# Patient Record
Sex: MALE | Race: ASIAN
[De-identification: names, ages, dates, MRNs, and addresses within clinical notes are randomized per-mention and may not be internally consistent; named-entity substitution may affect disease eponyms.]

---

## 2019-12-13 ENCOUNTER — HOSPITAL ENCOUNTER (INPATIENT)
Dept: HOSPITAL 54 - TELE-TD | Age: 79
LOS: 5 days | Discharge: NURSING, REHAB OR LONG-TERM CARE | DRG: 308 | End: 2019-12-18
Attending: INTERNAL MEDICINE | Admitting: INTERNAL MEDICINE
Payer: MEDICARE

## 2019-12-13 VITALS — HEIGHT: 69 IN | BODY MASS INDEX: 28.5 KG/M2 | WEIGHT: 192.44 LBS

## 2019-12-13 DIAGNOSIS — Z87.891: ICD-10-CM

## 2019-12-13 DIAGNOSIS — E78.5: ICD-10-CM

## 2019-12-13 DIAGNOSIS — E87.6: ICD-10-CM

## 2019-12-13 DIAGNOSIS — J45.909: ICD-10-CM

## 2019-12-13 DIAGNOSIS — F32.9: ICD-10-CM

## 2019-12-13 DIAGNOSIS — E11.9: ICD-10-CM

## 2019-12-13 DIAGNOSIS — C61: ICD-10-CM

## 2019-12-13 DIAGNOSIS — Z85.46: ICD-10-CM

## 2019-12-13 DIAGNOSIS — N17.0: ICD-10-CM

## 2019-12-13 DIAGNOSIS — D64.9: ICD-10-CM

## 2019-12-13 DIAGNOSIS — J47.9: ICD-10-CM

## 2019-12-13 DIAGNOSIS — G93.41: ICD-10-CM

## 2019-12-13 DIAGNOSIS — E46: ICD-10-CM

## 2019-12-13 DIAGNOSIS — I48.91: Primary | ICD-10-CM

## 2019-12-13 DIAGNOSIS — C79.51: ICD-10-CM

## 2019-12-13 DIAGNOSIS — I95.9: ICD-10-CM

## 2019-12-13 DIAGNOSIS — I10: ICD-10-CM

## 2019-12-13 DIAGNOSIS — F03.90: ICD-10-CM

## 2019-12-13 DIAGNOSIS — E87.2: ICD-10-CM

## 2019-12-13 DIAGNOSIS — F41.9: ICD-10-CM

## 2019-12-13 DIAGNOSIS — E86.0: ICD-10-CM

## 2019-12-13 DIAGNOSIS — D68.59: ICD-10-CM

## 2019-12-13 DIAGNOSIS — J32.9: ICD-10-CM

## 2019-12-13 PROCEDURE — A4349 DISPOSABLE MALE EXTERNAL CAT: HCPCS

## 2019-12-13 PROCEDURE — A6253 ABSORPT DRG > 48 SQ IN W/O B: HCPCS

## 2019-12-13 PROCEDURE — G0378 HOSPITAL OBSERVATION PER HR: HCPCS

## 2019-12-14 VITALS — SYSTOLIC BLOOD PRESSURE: 110 MMHG | DIASTOLIC BLOOD PRESSURE: 40 MMHG

## 2019-12-14 VITALS — SYSTOLIC BLOOD PRESSURE: 125 MMHG | DIASTOLIC BLOOD PRESSURE: 54 MMHG

## 2019-12-14 VITALS — SYSTOLIC BLOOD PRESSURE: 125 MMHG | DIASTOLIC BLOOD PRESSURE: 55 MMHG

## 2019-12-14 VITALS — DIASTOLIC BLOOD PRESSURE: 90 MMHG | SYSTOLIC BLOOD PRESSURE: 134 MMHG

## 2019-12-14 VITALS — DIASTOLIC BLOOD PRESSURE: 42 MMHG | SYSTOLIC BLOOD PRESSURE: 95 MMHG

## 2019-12-14 VITALS — DIASTOLIC BLOOD PRESSURE: 60 MMHG | SYSTOLIC BLOOD PRESSURE: 126 MMHG

## 2019-12-14 LAB
ALBUMIN SERPL BCP-MCNC: 2.2 G/DL (ref 3.4–5)
ALP SERPL-CCNC: 312 U/L (ref 46–116)
ALT SERPL W P-5'-P-CCNC: 31 U/L (ref 12–78)
AST SERPL W P-5'-P-CCNC: 23 U/L (ref 15–37)
BASOPHILS # BLD AUTO: 0 /CMM (ref 0–0.2)
BASOPHILS NFR BLD AUTO: 1.2 % (ref 0–2)
BILIRUB SERPL-MCNC: 0.7 MG/DL (ref 0.2–1)
BUN SERPL-MCNC: 11 MG/DL (ref 7–18)
CALCIUM SERPL-MCNC: 7.1 MG/DL (ref 8.5–10.1)
CHLORIDE SERPL-SCNC: 108 MMOL/L (ref 98–107)
CHOLEST SERPL-MCNC: 104 MG/DL (ref ?–200)
CO2 SERPL-SCNC: 24 MMOL/L (ref 21–32)
CREAT SERPL-MCNC: 0.9 MG/DL (ref 0.6–1.3)
EOSINOPHIL NFR BLD AUTO: 3.3 % (ref 0–6)
FERRITIN SERPL-MCNC: 256 NG/ML (ref 8–388)
GLUCOSE SERPL-MCNC: 133 MG/DL (ref 74–106)
HCT VFR BLD AUTO: 31 % (ref 39–51)
HDLC SERPL-MCNC: 24 MG/DL (ref 40–60)
HGB BLD-MCNC: 10.3 G/DL (ref 13.5–17.5)
IRON SERPL-MCNC: 26 UG/DL (ref 50–175)
LDLC SERPL DIRECT ASSAY-MCNC: 62 MG/DL (ref 0–99)
LYMPHOCYTES NFR BLD AUTO: 1.4 /CMM (ref 0.8–4.8)
LYMPHOCYTES NFR BLD AUTO: 34.5 % (ref 20–44)
MCHC RBC AUTO-ENTMCNC: 33 G/DL (ref 31–36)
MCV RBC AUTO: 88 FL (ref 80–96)
MONOCYTES NFR BLD AUTO: 0.4 /CMM (ref 0.1–1.3)
MONOCYTES NFR BLD AUTO: 9.8 % (ref 2–12)
NEUTROPHILS # BLD AUTO: 2 /CMM (ref 1.8–8.9)
NEUTROPHILS NFR BLD AUTO: 51.2 % (ref 43–81)
PLATELET # BLD AUTO: 172 /CMM (ref 150–450)
POTASSIUM SERPL-SCNC: 2.8 MMOL/L (ref 3.5–5.1)
PROT SERPL-MCNC: 5.2 G/DL (ref 6.4–8.2)
RBC # BLD AUTO: 3.51 MIL/UL (ref 4.5–6)
SODIUM SERPL-SCNC: 142 MMOL/L (ref 136–145)
TIBC SERPL-MCNC: 179 UG/DL (ref 250–450)
TRIGL SERPL-MCNC: 122 MG/DL (ref 30–150)
TSH SERPL DL<=0.005 MIU/L-ACNC: 2.57 UIU/ML (ref 0.36–3.74)
WBC NRBC COR # BLD AUTO: 4 K/UL (ref 4.3–11)

## 2019-12-14 RX ADMIN — SODIUM CHLORIDE SCH MLS/HR: 9 INJECTION, SOLUTION INTRAVENOUS at 01:51

## 2019-12-14 RX ADMIN — POTASSIUM CHLORIDE SCH MLS/HR: 200 INJECTION, SOLUTION INTRAVENOUS at 06:55

## 2019-12-14 RX ADMIN — RAMIPRIL SCH MG: 5 CAPSULE ORAL at 20:49

## 2019-12-14 RX ADMIN — QUETIAPINE SCH MG: 25 TABLET, FILM COATED ORAL at 22:00

## 2019-12-14 RX ADMIN — POTASSIUM CHLORIDE SCH MLS/HR: 200 INJECTION, SOLUTION INTRAVENOUS at 03:33

## 2019-12-14 RX ADMIN — FAMOTIDINE SCH MG: 20 TABLET, FILM COATED ORAL at 18:15

## 2019-12-14 RX ADMIN — PANTOPRAZOLE SODIUM SCH MG: 40 TABLET, DELAYED RELEASE ORAL at 08:15

## 2019-12-14 RX ADMIN — BUDESONIDE SCH MG: 0.25 SUSPENSION RESPIRATORY (INHALATION) at 10:43

## 2019-12-14 RX ADMIN — PANCRELIPASE SCH EACH: 4200; 24600; 14200 CAPSULE, DELAYED RELEASE ORAL at 18:15

## 2019-12-14 RX ADMIN — SODIUM CHLORIDE SCH MLS/HR: 9 INJECTION, SOLUTION INTRAVENOUS at 20:52

## 2019-12-14 RX ADMIN — PANCRELIPASE SCH EACH: 4200; 24600; 14200 CAPSULE, DELAYED RELEASE ORAL at 13:19

## 2019-12-14 RX ADMIN — MAGNESIUM HYDROXIDE PRN ML: 400 SUSPENSION ORAL at 09:48

## 2019-12-14 RX ADMIN — RAMIPRIL SCH MG: 5 CAPSULE ORAL at 09:46

## 2019-12-14 RX ADMIN — FINASTERIDE SCH MG: 5 TABLET, FILM COATED ORAL at 08:16

## 2019-12-14 RX ADMIN — SODIUM CHLORIDE SCH MLS/HR: 9 INJECTION, SOLUTION INTRAVENOUS at 11:25

## 2019-12-14 RX ADMIN — PANCRELIPASE SCH EACH: 4200; 24600; 14200 CAPSULE, DELAYED RELEASE ORAL at 08:47

## 2019-12-14 RX ADMIN — POTASSIUM CHLORIDE SCH MLS/HR: 200 INJECTION, SOLUTION INTRAVENOUS at 04:33

## 2019-12-14 RX ADMIN — POTASSIUM CHLORIDE SCH MLS/HR: 200 INJECTION, SOLUTION INTRAVENOUS at 05:49

## 2019-12-14 RX ADMIN — BUDESONIDE SCH MG: 0.25 SUSPENSION RESPIRATORY (INHALATION) at 19:37

## 2019-12-14 RX ADMIN — BICALUTAMIDE SCH MG: 50 TABLET ORAL at 20:47

## 2019-12-14 RX ADMIN — ENOXAPARIN SODIUM SCH MG: 40 INJECTION SUBCUTANEOUS at 09:00

## 2019-12-14 NOTE — NUR
PT FRIEND MARTHA CAME BEDSIDE AND EXPRESSED CONCERN OVER PT LIVING ARRANGEMENT REQUESTED TO 
MEET WITH  MICHAEL SINCE PT LIVES AT HOME ALONE.

## 2019-12-14 NOTE — NUR
RN OPENING NOTES

PT IS ASLEEP IN BED WITH HOB ELEVATED TO 45 DEGREES. PT HAS SAILAJA IV 20 GAUGE RUNNING NS @ 100 
MLS/HR. PT IS SR IN 67 HR ON TELE MONITOR. PT SHOWS NO SIGNS OF SOB OR PAIN AT PRESENT 
MOMENT. BED IS LOCKED AND IN LOWEST POSITION WITH CALL LIGHT IN REACH. WILL CONTINUE TO 
MONITOR.

## 2019-12-14 NOTE — NUR
TD RN OPENING NOTE



REPORT RECEIVED BY JAIME FROM [place]. PATIENT ARRIVED IN [facility]. PATIENT IS A/OX4 
ENGLISH SPEAKER. NO SIGN OF DISTRESS OR DISCOMFORT. 02 SAT ON ROOM AIR IS 96%. HAS THREE IV 
ACCESS, RT FOREARM #20, LT FOREARM #20, AND LT UPPERARM #20 ALL PATENT AND FLUSHED. PATIENT 
DOES NOT COMPLAIN OF ANY PAIN AT THE MOMENT. SINUS RHYTHM ON THE MONITOR. LUNGS SOUNDS 
CLEAR. SKIN IS INTACT JUST MINOR SCRATCHER AND BRUISES. ALL SAFETY PRECAUTIONS HAVE BEEN 
APPLIED, WILL CONTINUE TO MONITOR PATIENT.

## 2019-12-14 NOTE — NUR
RN CLOSING NOTES 

PT DENIES ANY PAIN OR SOB AT PRESENT MOMENT PT IS SR ON TELE MONITOR AND CONTINUES TO BE ON 
O2 2 L NC. PT HAS NS RUNNING  MLS/HR. PT IS A&OX4 AND IS ABLE TO MAKE NEEDS KNOWN. BED 
IS LOCKED AND IN LOWEST POSITION WITH CALL LIGHT IN REACH. WILL ENDORSE JACQUES TO NIGHT SHIFT 
RN.

## 2019-12-14 NOTE — NUR
SPOKE WITH PT ABOUT HOME MEDIATIONS PER PT HE HAS NO ONE AT HOME THAT CAN BRING THEM TO HIM. 
INFORMED PHARMACY. WILL TRY TO OBTAIN PT'S PERSONAL PHARMACY.

## 2019-12-14 NOTE — NUR
TD RN CLOSING NOTE



PATIENT IN BED WITH NO SIGNS OF DISTRESS. PATIENT REMAINS ON 2L OF O2 SATURATING AT 97%. 
PATIENT ON MONITOR IS SINUS RHYTHM WITH HR IN 60'S/ NO COMPLAINT OF PAIN. ALL SAFETY 
PRECAUTIONS APPPLIED. ENDORSED PATIENT TO MORNING SHIFT NURSE.

## 2019-12-14 NOTE — NUR
RN NOTE:



RECEIVED A CALL FROM LAB, SPOKE WITH ZACARIAS DODSON+ [id number]. PAGED DR. BARNEY, AWAITING FOR 
ORDERS. WILL NOTIFY PRIMARY NURSE. 

-------------------------------------------------------------------------------

Addendum: 12/14/19 at 0247 by LAINE MICHEL RN

-------------------------------------------------------------------------------

RN NOTE:



0232: RECEIVED NEW ORDERS FROM DR. BARNEY.

## 2019-12-15 VITALS — SYSTOLIC BLOOD PRESSURE: 129 MMHG | DIASTOLIC BLOOD PRESSURE: 42 MMHG

## 2019-12-15 VITALS — SYSTOLIC BLOOD PRESSURE: 145 MMHG | DIASTOLIC BLOOD PRESSURE: 56 MMHG

## 2019-12-15 VITALS — DIASTOLIC BLOOD PRESSURE: 60 MMHG | SYSTOLIC BLOOD PRESSURE: 146 MMHG

## 2019-12-15 VITALS — DIASTOLIC BLOOD PRESSURE: 52 MMHG | SYSTOLIC BLOOD PRESSURE: 137 MMHG

## 2019-12-15 VITALS — SYSTOLIC BLOOD PRESSURE: 147 MMHG | DIASTOLIC BLOOD PRESSURE: 65 MMHG

## 2019-12-15 VITALS — DIASTOLIC BLOOD PRESSURE: 78 MMHG | SYSTOLIC BLOOD PRESSURE: 143 MMHG

## 2019-12-15 LAB
ALBUMIN SERPL BCP-MCNC: 2.2 G/DL (ref 3.4–5)
ALP SERPL-CCNC: 280 U/L (ref 46–116)
ALT SERPL W P-5'-P-CCNC: 30 U/L (ref 12–78)
AST SERPL W P-5'-P-CCNC: 24 U/L (ref 15–37)
BASOPHILS # BLD AUTO: 0 /CMM (ref 0–0.2)
BASOPHILS NFR BLD AUTO: 0.4 % (ref 0–2)
BILIRUB SERPL-MCNC: 0.5 MG/DL (ref 0.2–1)
BUN SERPL-MCNC: 5 MG/DL (ref 7–18)
CALCIUM SERPL-MCNC: 7 MG/DL (ref 8.5–10.1)
CHLORIDE SERPL-SCNC: 113 MMOL/L (ref 98–107)
CO2 SERPL-SCNC: 26 MMOL/L (ref 21–32)
CREAT SERPL-MCNC: 0.6 MG/DL (ref 0.6–1.3)
EOSINOPHIL NFR BLD AUTO: 2.9 % (ref 0–6)
GLUCOSE SERPL-MCNC: 119 MG/DL (ref 74–106)
HCT VFR BLD AUTO: 29 % (ref 39–51)
HGB BLD-MCNC: 9.4 G/DL (ref 13.5–17.5)
LYMPHOCYTES NFR BLD AUTO: 1.3 /CMM (ref 0.8–4.8)
LYMPHOCYTES NFR BLD AUTO: 28.1 % (ref 20–44)
MAGNESIUM SERPL-MCNC: 1.9 MG/DL (ref 1.8–2.4)
MCHC RBC AUTO-ENTMCNC: 33 G/DL (ref 31–36)
MCV RBC AUTO: 89 FL (ref 80–96)
MONOCYTES NFR BLD AUTO: 0.4 /CMM (ref 0.1–1.3)
MONOCYTES NFR BLD AUTO: 8.8 % (ref 2–12)
NEUTROPHILS # BLD AUTO: 2.7 /CMM (ref 1.8–8.9)
NEUTROPHILS NFR BLD AUTO: 59.8 % (ref 43–81)
PHOSPHATE SERPL-MCNC: 1.5 MG/DL (ref 2.5–4.9)
PLATELET # BLD AUTO: 151 /CMM (ref 150–450)
POTASSIUM SERPL-SCNC: 3.4 MMOL/L (ref 3.5–5.1)
PROT SERPL-MCNC: 5.3 G/DL (ref 6.4–8.2)
RBC # BLD AUTO: 3.22 MIL/UL (ref 4.5–6)
SODIUM SERPL-SCNC: 147 MMOL/L (ref 136–145)
WBC NRBC COR # BLD AUTO: 4.6 K/UL (ref 4.3–11)

## 2019-12-15 RX ADMIN — LEVALBUTEROL HYDROCHLORIDE SCH MG: 1.25 SOLUTION, CONCENTRATE RESPIRATORY (INHALATION) at 14:33

## 2019-12-15 RX ADMIN — RAMIPRIL SCH MG: 5 CAPSULE ORAL at 21:26

## 2019-12-15 RX ADMIN — BICALUTAMIDE SCH MG: 50 TABLET ORAL at 21:23

## 2019-12-15 RX ADMIN — PANTOPRAZOLE SODIUM SCH MG: 40 TABLET, DELAYED RELEASE ORAL at 08:53

## 2019-12-15 RX ADMIN — QUETIAPINE SCH MG: 25 TABLET, FILM COATED ORAL at 21:27

## 2019-12-15 RX ADMIN — PANCRELIPASE SCH EACH: 4200; 24600; 14200 CAPSULE, DELAYED RELEASE ORAL at 13:12

## 2019-12-15 RX ADMIN — BUDESONIDE SCH MG: 0.25 SUSPENSION RESPIRATORY (INHALATION) at 20:05

## 2019-12-15 RX ADMIN — FAMOTIDINE SCH MG: 20 TABLET, FILM COATED ORAL at 18:10

## 2019-12-15 RX ADMIN — SODIUM CHLORIDE SCH MLS/HR: 9 INJECTION, SOLUTION INTRAVENOUS at 06:56

## 2019-12-15 RX ADMIN — ENOXAPARIN SODIUM SCH MG: 40 INJECTION SUBCUTANEOUS at 08:57

## 2019-12-15 RX ADMIN — PANCRELIPASE SCH EACH: 4200; 24600; 14200 CAPSULE, DELAYED RELEASE ORAL at 18:10

## 2019-12-15 RX ADMIN — RAMIPRIL SCH MG: 5 CAPSULE ORAL at 08:55

## 2019-12-15 RX ADMIN — LEVALBUTEROL HYDROCHLORIDE SCH MG: 1.25 SOLUTION, CONCENTRATE RESPIRATORY (INHALATION) at 23:31

## 2019-12-15 RX ADMIN — BUDESONIDE SCH MG: 0.25 SUSPENSION RESPIRATORY (INHALATION) at 08:11

## 2019-12-15 RX ADMIN — LEVALBUTEROL HYDROCHLORIDE SCH MG: 1.25 SOLUTION, CONCENTRATE RESPIRATORY (INHALATION) at 13:02

## 2019-12-15 RX ADMIN — PANCRELIPASE SCH EACH: 4200; 24600; 14200 CAPSULE, DELAYED RELEASE ORAL at 08:55

## 2019-12-15 RX ADMIN — FINASTERIDE SCH MG: 5 TABLET, FILM COATED ORAL at 08:55

## 2019-12-15 NOTE — NUR
TD/RN     ROUNDS - DR. BENNETT



PT SEEN & EXAMINED BY DR. BENNETT. NO NEW ORDERS RECEIVED AT THIS TIME.  MONITORING 
MONITORING.

## 2019-12-15 NOTE — NUR
RN NOTES





IN BED RESTING COMFORTABLY WITH NO APPARENT DISTRESS. BREATHING EVEN AND UNLABORED. ON 2 LPM 
O2 VIA NASAL CANNULA TOLERATING WELL. NO COMPLAINT OF PAIN OR DISCOMFORT. ALERT AND ORIENTED 
WITH EPISODES OF CONFUSION. ABLE TO VERBALIZE NEEDS. VITAL SIGNS WITH NORMAL LEVEL. KEPT 
CLEAN AND DRY. WILL ENDORSE TO AM SHIFT FOR CONTINUITY OF CARE.

## 2019-12-15 NOTE — NUR
TELE RN OPENING NOTES,



RECEIVED PATIENT FROM AM RN IN BED AWAKE ALERT X 4. HOB ELEVATED TO 45 DEGREES. PATIENT HAS 
TKO SAILAJA IV 20 GAUGE. PATIENT IS SR IN 70 HR ON TELE MONITOR. PATIENT SHOWS NO SIGNS OF SOB 
OR PAIN AT PRESENT MOMENT. BED IS LOCKED AND IN LOWEST POSITION WITH CALL LIGHT IN REACH. 
WILL CONTINUE TO MONITOR.

## 2019-12-15 NOTE — NUR
TELE1/RN     AM SHIFT CLOSING NOTES



ALL NEEDS MET, NO ACUTE CHANGE OF CONDITION NOTED DURING THE SHIFT. PT ENDORSED TO PM NURSE 
TO CONTINUE CARE. CL WITHIN REACHED AND SAFETY MAINTAINED.

## 2019-12-15 NOTE — NUR
TD1/RN     AM SHIFT INITIAL NOTES



RECEIVED PT AWAKE IN BED, A/O X 4, DENIES ANY SYMPTOMS, NO ACUTE CHANGE OF CONDITION. ON 2L 
O2 VIA N/C SATURATING @ 99%, RESPIRATIONS EVEN & UNLABORED, LUNG SOUNDS NOTED WITH WHEEZING. 
NOTED WITH NON-PRODUCTIVE COUGH. ON TELE WITH SINUS RHYTHM, HR 81. PT WITH ON GOING IV 
INFUSION OF NS @ 100CC/HR, IV SITE, WITH NO S/S OF INFECTION. PT IS COMFORTABLE, SCHEDULED 
AM MED TO BE GIVEN. CL WITHIN REACHED AND SAFETY MAINTAINED. ON GOING MONITORING.

## 2019-12-16 VITALS — DIASTOLIC BLOOD PRESSURE: 65 MMHG | SYSTOLIC BLOOD PRESSURE: 154 MMHG

## 2019-12-16 VITALS — SYSTOLIC BLOOD PRESSURE: 152 MMHG | DIASTOLIC BLOOD PRESSURE: 63 MMHG

## 2019-12-16 VITALS — SYSTOLIC BLOOD PRESSURE: 138 MMHG | DIASTOLIC BLOOD PRESSURE: 72 MMHG

## 2019-12-16 VITALS — SYSTOLIC BLOOD PRESSURE: 154 MMHG | DIASTOLIC BLOOD PRESSURE: 66 MMHG

## 2019-12-16 VITALS — DIASTOLIC BLOOD PRESSURE: 63 MMHG | SYSTOLIC BLOOD PRESSURE: 152 MMHG

## 2019-12-16 VITALS — SYSTOLIC BLOOD PRESSURE: 143 MMHG | DIASTOLIC BLOOD PRESSURE: 67 MMHG

## 2019-12-16 VITALS — SYSTOLIC BLOOD PRESSURE: 132 MMHG | DIASTOLIC BLOOD PRESSURE: 42 MMHG

## 2019-12-16 LAB
BUN SERPL-MCNC: 6 MG/DL (ref 7–18)
CALCIUM SERPL-MCNC: 6.9 MG/DL (ref 8.5–10.1)
CHLORIDE SERPL-SCNC: 109 MMOL/L (ref 98–107)
CO2 SERPL-SCNC: 28 MMOL/L (ref 21–32)
CREAT SERPL-MCNC: 0.6 MG/DL (ref 0.6–1.3)
GLUCOSE SERPL-MCNC: 110 MG/DL (ref 74–106)
PHOSPHATE SERPL-MCNC: 1.8 MG/DL (ref 2.5–4.9)
POTASSIUM SERPL-SCNC: 3.2 MMOL/L (ref 3.5–5.1)
SODIUM SERPL-SCNC: 144 MMOL/L (ref 136–145)

## 2019-12-16 RX ADMIN — LEVALBUTEROL HYDROCHLORIDE SCH MG: 1.25 SOLUTION, CONCENTRATE RESPIRATORY (INHALATION) at 23:48

## 2019-12-16 RX ADMIN — PANTOPRAZOLE SODIUM SCH MG: 40 TABLET, DELAYED RELEASE ORAL at 08:48

## 2019-12-16 RX ADMIN — ENOXAPARIN SODIUM SCH MG: 40 INJECTION SUBCUTANEOUS at 08:50

## 2019-12-16 RX ADMIN — BUDESONIDE SCH MG: 0.25 SUSPENSION RESPIRATORY (INHALATION) at 19:45

## 2019-12-16 RX ADMIN — PANCRELIPASE SCH EACH: 4200; 24600; 14200 CAPSULE, DELAYED RELEASE ORAL at 12:39

## 2019-12-16 RX ADMIN — LEVALBUTEROL HYDROCHLORIDE SCH MG: 1.25 SOLUTION, CONCENTRATE RESPIRATORY (INHALATION) at 14:44

## 2019-12-16 RX ADMIN — BUDESONIDE SCH MG: 0.25 SUSPENSION RESPIRATORY (INHALATION) at 07:40

## 2019-12-16 RX ADMIN — MAGNESIUM HYDROXIDE PRN ML: 400 SUSPENSION ORAL at 08:52

## 2019-12-16 RX ADMIN — LEVALBUTEROL HYDROCHLORIDE SCH MG: 1.25 SOLUTION, CONCENTRATE RESPIRATORY (INHALATION) at 07:40

## 2019-12-16 RX ADMIN — RAMIPRIL SCH MG: 5 CAPSULE ORAL at 08:56

## 2019-12-16 RX ADMIN — PANCRELIPASE SCH EACH: 4200; 24600; 14200 CAPSULE, DELAYED RELEASE ORAL at 08:48

## 2019-12-16 RX ADMIN — PANCRELIPASE SCH EACH: 4200; 24600; 14200 CAPSULE, DELAYED RELEASE ORAL at 17:06

## 2019-12-16 RX ADMIN — FINASTERIDE SCH MG: 5 TABLET, FILM COATED ORAL at 08:48

## 2019-12-16 RX ADMIN — RAMIPRIL SCH MG: 5 CAPSULE ORAL at 20:50

## 2019-12-16 RX ADMIN — BICALUTAMIDE SCH MG: 50 TABLET ORAL at 20:56

## 2019-12-16 RX ADMIN — QUETIAPINE SCH MG: 25 TABLET, FILM COATED ORAL at 23:48

## 2019-12-16 RX ADMIN — FAMOTIDINE SCH MG: 20 TABLET, FILM COATED ORAL at 17:05

## 2019-12-16 NOTE — NUR
TELE 1/RN      AFTERNOON ROUNDS



PATIENT IN STABLE CONDITION, NO ACUTE CHANGES NOTED. PM CARE PROVIDED, TURNED AND 
REPOSITIONED. MONITORING CONTINUED.

## 2019-12-16 NOTE — NUR
PM TELE RN CLOSING NOTES,



PATIENT IN BED SLEEPING COMFORTABLY . HOB ELEVATED TO 45 DEGREES. PATIENT HAS IV 20 GAUGE 
WITH ONGOING 80ML/HR 1/2 NS W 20MEQ. PATIENT IS SR IN 70 HR ON TELE MONITOR. PATIENT SHOWS 
NO SIGNS OF SOB OR PAIN AT PRESENT MOMENT. BED IS LOCKED AND IN LOWEST POSITION WITH CALL 
LIGHT IN REACH. WILL ENDORSE THE PATIENT TO AM RN FOR JACQUES.

## 2019-12-16 NOTE — NUR
TELE1/RN     UPDATE INFORMATION



PRESENTED DR. OHARA WITH RESULT OF CT (FROM [place]) AND ALSO RESULT OF HGA1C.  NO NEW ORDER 
RECEIVED.

## 2019-12-16 NOTE — NUR
TELE 1/RM         AM SHIFT CLOSING NOTES



ALL PT NEEDS MET, PT IS STABLE. NO ACUTE CHANGE OF PT CONDITION. CALL LIGHT IN REACH. SAFETY 
MAINTAINED. PATIENT ENDORSED TO PM NURSE TO CONTINUE CARE.

## 2019-12-16 NOTE — NUR
TELE1/RN     MEDICAL RECORDS



OBTAINED MEDICAL RECORDS FROM PT'S OWN PRIMARY DOCTOR, DR. SCAR BARROSO. 



EKG REQUEST FOR [place] FAXED, AWAITING FOR RESULT TO BE OBTAINED.

## 2019-12-16 NOTE — NUR
2340  MADE AWARE OF PATIENT'S REQUEST FOR SLEEPING PILL WITH ORDER TO GIVE 
TEMAZEPAM 15MG PRN HS FOR INSOMNIA.  ORDER NOTED AND CARRIED OUT.

## 2019-12-16 NOTE — NUR
PM RN OPENING NOTES,



RECEIVED PATIENT IN BED RESTING. HOB SLIGHTLY ELEVATED. PATIENT IS A/O X4, ON ROOM AIR O2 
SAT @ 96%. RESPIRATIONS ARE EVEN AND UNLABORED. ON TELE WITH SR. HR AT 79. ONGOING IV 
@80ML/HR ON RIGHT HAND. IV SITE HAS NO S/S OF INFILTRATION. PATIENT HAS NO COMPLAIN OF PAIN 
AT THIS TIME. CALL LIGHT WITHIN REACH AND SAFETY MAINTAINED. WILL CONTINUE TO MONITOR.

## 2019-12-16 NOTE — NUR
TELE1/RN     ROUNDS - DR. ABBOTT



PT SEEN & EXAMINED BY SCAR, NO NEW ORDER RECEIVED AT THIS TIME.  MONITORING CONTINUED.

## 2019-12-16 NOTE — NUR
TELE 1 RN      AM SHIFT OPENING NOTES 



PT WAS ASLEEP WHEN  I RECEIVED HIM. RECEIVED IN [place]PINE POSITION WITH  THE HOB SLIGHTLY 
ELEVATED. PT IS AOX4, ON ROOM AIR O2 SAT @ 96%. RESPIRATIONS ARE EVEN AND UNLABORED. LUNG 
SOUNDS: WHEEZING IS HEARD. ON TELE WITH SINUS RHYTHM. HR AT 79. NO ONGOING IV. IV SITE HAS 
NO S/S OF INFECTION. PT IS COMFORTABLE RESTING IN BED. CALL LIGHT IN REACH AND SAFETY 
MAINTAINED. ON GOING MONITORING

## 2019-12-17 VITALS — DIASTOLIC BLOOD PRESSURE: 69 MMHG | SYSTOLIC BLOOD PRESSURE: 150 MMHG

## 2019-12-17 VITALS — DIASTOLIC BLOOD PRESSURE: 83 MMHG | SYSTOLIC BLOOD PRESSURE: 137 MMHG

## 2019-12-17 VITALS — DIASTOLIC BLOOD PRESSURE: 67 MMHG | SYSTOLIC BLOOD PRESSURE: 137 MMHG

## 2019-12-17 VITALS — SYSTOLIC BLOOD PRESSURE: 154 MMHG | DIASTOLIC BLOOD PRESSURE: 67 MMHG

## 2019-12-17 VITALS — SYSTOLIC BLOOD PRESSURE: 156 MMHG | DIASTOLIC BLOOD PRESSURE: 74 MMHG

## 2019-12-17 LAB
BUN SERPL-MCNC: 6 MG/DL (ref 7–18)
CALCIUM SERPL-MCNC: 7.9 MG/DL (ref 8.5–10.1)
CHLORIDE SERPL-SCNC: 107 MMOL/L (ref 98–107)
CO2 SERPL-SCNC: 25 MMOL/L (ref 21–32)
CREAT SERPL-MCNC: 0.7 MG/DL (ref 0.6–1.3)
GLUCOSE SERPL-MCNC: 185 MG/DL (ref 74–106)
POTASSIUM SERPL-SCNC: 4 MMOL/L (ref 3.5–5.1)
SODIUM SERPL-SCNC: 144 MMOL/L (ref 136–145)

## 2019-12-17 RX ADMIN — QUETIAPINE SCH MG: 25 TABLET, FILM COATED ORAL at 21:59

## 2019-12-17 RX ADMIN — PANCRELIPASE SCH EACH: 4200; 24600; 14200 CAPSULE, DELAYED RELEASE ORAL at 08:50

## 2019-12-17 RX ADMIN — PANCRELIPASE SCH EACH: 4200; 24600; 14200 CAPSULE, DELAYED RELEASE ORAL at 18:10

## 2019-12-17 RX ADMIN — MONTELUKAST SODIUM SCH MG: 10 TABLET, FILM COATED ORAL at 22:06

## 2019-12-17 RX ADMIN — BICALUTAMIDE SCH MG: 50 TABLET ORAL at 22:04

## 2019-12-17 RX ADMIN — ENOXAPARIN SODIUM SCH MG: 40 INJECTION SUBCUTANEOUS at 08:58

## 2019-12-17 RX ADMIN — TEMAZEPAM PRN MG: 15 CAPSULE ORAL at 00:25

## 2019-12-17 RX ADMIN — PANCRELIPASE SCH EACH: 4200; 24600; 14200 CAPSULE, DELAYED RELEASE ORAL at 12:26

## 2019-12-17 RX ADMIN — RAMIPRIL SCH MG: 5 CAPSULE ORAL at 22:00

## 2019-12-17 RX ADMIN — PANTOPRAZOLE SODIUM SCH MG: 40 TABLET, DELAYED RELEASE ORAL at 08:54

## 2019-12-17 RX ADMIN — FLUTICASONE PROPIONATE SCH SPRAY: 50 SPRAY, METERED NASAL at 10:52

## 2019-12-17 RX ADMIN — BUDESONIDE SCH MG: 0.25 SUSPENSION RESPIRATORY (INHALATION) at 07:40

## 2019-12-17 RX ADMIN — AMOXICILLIN AND CLAVULANATE POTASSIUM SCH MG: 875; 125 TABLET, FILM COATED ORAL at 10:52

## 2019-12-17 RX ADMIN — LEVALBUTEROL HYDROCHLORIDE SCH MG: 1.25 SOLUTION, CONCENTRATE RESPIRATORY (INHALATION) at 22:58

## 2019-12-17 RX ADMIN — BUDESONIDE SCH MG: 0.25 SUSPENSION RESPIRATORY (INHALATION) at 20:07

## 2019-12-17 RX ADMIN — LEVALBUTEROL HYDROCHLORIDE SCH MG: 1.25 SOLUTION, CONCENTRATE RESPIRATORY (INHALATION) at 15:31

## 2019-12-17 RX ADMIN — FAMOTIDINE SCH MG: 20 TABLET, FILM COATED ORAL at 18:10

## 2019-12-17 RX ADMIN — AMOXICILLIN AND CLAVULANATE POTASSIUM SCH MG: 875; 125 TABLET, FILM COATED ORAL at 21:59

## 2019-12-17 RX ADMIN — MONTELUKAST SODIUM SCH MG: 10 TABLET, FILM COATED ORAL at 10:52

## 2019-12-17 RX ADMIN — FINASTERIDE SCH MG: 5 TABLET, FILM COATED ORAL at 08:50

## 2019-12-17 RX ADMIN — RAMIPRIL SCH MG: 5 CAPSULE ORAL at 08:56

## 2019-12-17 RX ADMIN — LEVALBUTEROL HYDROCHLORIDE SCH MG: 1.25 SOLUTION, CONCENTRATE RESPIRATORY (INHALATION) at 07:40

## 2019-12-17 RX ADMIN — TEMAZEPAM PRN MG: 15 CAPSULE ORAL at 22:04

## 2019-12-17 NOTE — NUR
TELE1/RN     PT EVALUATION



PT BEING SEEN BY PHYSICAL THERAPIST FOR EVALUATION.  

-------------------------------------------------------------------------------

Addendum: 12/17/19 at 1129 by ALCIDES RANDOLPH RN

-------------------------------------------------------------------------------

ADDENDUM:  PT WALKED 40 FEET WITH ASSISTANCE IN FRONT WHEEL WALKER.

## 2019-12-17 NOTE — NUR
TELE1/RN     ROUNDS - DR. LEONE



UPDATED PT'S CONDITION. PT SEEN & EXAMINED BY DR. LEONE.  RECEIVED VERBAL ORDER TO 
DISCONTINUE IV OF KCL 20MEQ @ 80CC/HR. ORDER NOTED AND CARRIED OUT.

## 2019-12-17 NOTE — NUR
PM RN OPENING NOTES,



RECEIVED PATIENT IN BED RESTING. HOB SLIGHTLY ELEVATED. PATIENT IS A/O X4, ON ROOM AIR O2 
SAT @ 96%. RESPIRATIONS ARE EVEN AND UNLABORED. ON TELE WITH SR. HR AT 79.  IV ON RFA, 
PATENT, NO S/S OF INFILTRATION. PATIENT HAS NO COMPLAIN OF PAIN AT THIS TIME. CALL LIGHT 
WITHIN REACH AND SAFETY MAINTAINED. WILL CONTINUE TO MONITOR.

## 2019-12-17 NOTE — NUR
TELE 1/ RN                 AM CLOSING NOTES 



ALL PT NEEDS MET. NO ACUTE CHANGE OF CONDITION THROUGHOUT THE SHIFT. PT ENDORSED TO PM NURSE 
TO CONTINUE CARE. CL WITHIN REACH AND SAFETY MAINTAINED.

## 2019-12-17 NOTE — NUR
TELE 1/RN        AM INITIAL NOTES 



RECEIVED PATIENT ASLEEP IN BED. RECEIVED IN SUPINE POSITION WITH THE HOB SLIGHTLY ELEVATED. 
PT IS AOX4, ON NC SET UP AT 1L, O2 SAT @ 98%. RESPIRATIONS ARE EVEN AND UNLABORED. LUNG 
SOUNDS: WHEEZING IS HEARD. ON TELE WITH SINUS RHYTHM. HR AT 70. IV ON SALINE LOCK. IV SITE 
HAS NO S/S OF INFECTION. PT IS COMFORTABLE RESTING IN BED. CALL LIGHT IN REACH AND SAFETY 
MAINTAINED. ON GOING MONITORING

## 2019-12-18 VITALS — DIASTOLIC BLOOD PRESSURE: 75 MMHG | SYSTOLIC BLOOD PRESSURE: 151 MMHG

## 2019-12-18 VITALS — SYSTOLIC BLOOD PRESSURE: 135 MMHG | DIASTOLIC BLOOD PRESSURE: 69 MMHG

## 2019-12-18 VITALS — DIASTOLIC BLOOD PRESSURE: 71 MMHG | SYSTOLIC BLOOD PRESSURE: 160 MMHG

## 2019-12-18 VITALS — DIASTOLIC BLOOD PRESSURE: 73 MMHG | SYSTOLIC BLOOD PRESSURE: 149 MMHG

## 2019-12-18 RX ADMIN — ENOXAPARIN SODIUM SCH MG: 40 INJECTION SUBCUTANEOUS at 08:23

## 2019-12-18 RX ADMIN — RAMIPRIL SCH MG: 5 CAPSULE ORAL at 08:25

## 2019-12-18 RX ADMIN — FINASTERIDE SCH MG: 5 TABLET, FILM COATED ORAL at 08:25

## 2019-12-18 RX ADMIN — AMOXICILLIN AND CLAVULANATE POTASSIUM SCH MG: 875; 125 TABLET, FILM COATED ORAL at 08:24

## 2019-12-18 RX ADMIN — PANTOPRAZOLE SODIUM SCH MG: 40 TABLET, DELAYED RELEASE ORAL at 08:25

## 2019-12-18 RX ADMIN — BUDESONIDE SCH MG: 0.25 SUSPENSION RESPIRATORY (INHALATION) at 08:26

## 2019-12-18 RX ADMIN — PANCRELIPASE SCH EACH: 4200; 24600; 14200 CAPSULE, DELAYED RELEASE ORAL at 08:24

## 2019-12-18 RX ADMIN — PANCRELIPASE SCH EACH: 4200; 24600; 14200 CAPSULE, DELAYED RELEASE ORAL at 13:48

## 2019-12-18 RX ADMIN — FLUTICASONE PROPIONATE SCH SPRAY: 50 SPRAY, METERED NASAL at 08:24

## 2019-12-18 RX ADMIN — LEVALBUTEROL HYDROCHLORIDE SCH MG: 1.25 SOLUTION, CONCENTRATE RESPIRATORY (INHALATION) at 08:27

## 2019-12-18 NOTE — NUR
PM RN CLOSING NOTES,



PATIENT IN BED RESTING. HOB SLIGHTLY ELEVATED. PATIENT IS A/O X4, ON ROOM AIR O2 SAT @ 96%. 
RESPIRATIONS ARE EVEN AND UNLABORED. ON TELE WITH SR. HR AT 79.  IV ON RFA, NO IV RUNNING. 
PATENT, NO S/S OF INFILTRATION. PATIENT HAS NO COMPLAIN OF PAIN AT THIS TIME. CALL LIGHT 
WITHIN REACH AND SAFETY MAINTAINED. WILL ENDORSE PATIENT TO AM RN FOR JACQUES.

## 2019-12-18 NOTE — NUR
RN NOTES



PATIENT HAS BEEN DISCHARGED TO [facility] FOR CONTINUITY OF CARE. HE LEFT IN STABLE 
CONDITION VIA EMT. EXIT CARE WAS UTILIZED DURING DC PROCESS. PATIENT HAD GOOD UNDERSTANDING 
OF HEALTH, IV SITE WAS REMOVED, PT REFUSED TO SIGN BELONGINGS LIST. PT HAD CELLPHONE, 
, COLOGNE, AND FOOD WITH HIM. REPORT WAS GIVEN TO LOS

## 2019-12-18 NOTE — NUR
RN OPENING NOTES



RECEIVED PATIENT RESTING IN BED COMFORTABLY. HE IS AOX4, VERBAL, AND IS UNSTEADY. HE IS ON 
RA, TOLERATING WELL, NO S/SX OF RESP DISTRESS OR SOB. TELE MONITOR IS READING SR. NO EDEMA. 
SKIN IS INTACT. PT IS STATING HE WANTS A HOLCOMB CATH INSERTED BECAUSE HE NEEDS TO URINATED 
OFTEN, WILL PUT CONDOM CATH. RFA 22 G IS PATENT AND INTACT. HE IS ON CARDIAC DIET, 
TOLERATING WELL. SAFETY MEASURES HAVE BEEN IMPLEMENTED, CALL LIGHT IS WITHIN REACH, BED IS 
IN LOWEST AND LOCKED POSITION, SIDE RAILS UP X2, WILL CONTINUE TO MONITOR FOR ANY CHANGE.